# Patient Record
Sex: FEMALE
[De-identification: names, ages, dates, MRNs, and addresses within clinical notes are randomized per-mention and may not be internally consistent; named-entity substitution may affect disease eponyms.]

---

## 2018-08-29 ENCOUNTER — RESULT REVIEW (OUTPATIENT)
Age: 63
End: 2018-08-29

## 2020-03-24 PROBLEM — Z00.00 ENCOUNTER FOR PREVENTIVE HEALTH EXAMINATION: Status: ACTIVE | Noted: 2020-03-24

## 2020-04-03 ENCOUNTER — APPOINTMENT (OUTPATIENT)
Dept: SURGICAL ONCOLOGY | Facility: CLINIC | Age: 65
End: 2020-04-03

## 2021-01-29 ENCOUNTER — APPOINTMENT (OUTPATIENT)
Dept: SURGICAL ONCOLOGY | Facility: CLINIC | Age: 66
End: 2021-01-29
Payer: MEDICARE

## 2021-01-29 VITALS
SYSTOLIC BLOOD PRESSURE: 140 MMHG | DIASTOLIC BLOOD PRESSURE: 68 MMHG | HEIGHT: 63 IN | HEART RATE: 56 BPM | RESPIRATION RATE: 15 BRPM | WEIGHT: 155 LBS | BODY MASS INDEX: 27.46 KG/M2 | OXYGEN SATURATION: 95 %

## 2021-01-29 DIAGNOSIS — Z78.9 OTHER SPECIFIED HEALTH STATUS: ICD-10-CM

## 2021-01-29 DIAGNOSIS — Z86.79 PERSONAL HISTORY OF OTHER DISEASES OF THE CIRCULATORY SYSTEM: ICD-10-CM

## 2021-01-29 DIAGNOSIS — Z80.1 FAMILY HISTORY OF MALIGNANT NEOPLASM OF TRACHEA, BRONCHUS AND LUNG: ICD-10-CM

## 2021-01-29 DIAGNOSIS — Z80.3 FAMILY HISTORY OF MALIGNANT NEOPLASM OF BREAST: ICD-10-CM

## 2021-01-29 DIAGNOSIS — Z82.49 FAMILY HISTORY OF ISCHEMIC HEART DISEASE AND OTHER DISEASES OF THE CIRCULATORY SYSTEM: ICD-10-CM

## 2021-01-29 DIAGNOSIS — N64.4 MASTODYNIA: ICD-10-CM

## 2021-01-29 DIAGNOSIS — Z86.69 PERSONAL HISTORY OF OTHER DISEASES OF THE NERVOUS SYSTEM AND SENSE ORGANS: ICD-10-CM

## 2021-01-29 DIAGNOSIS — I51.9 HEART DISEASE, UNSPECIFIED: ICD-10-CM

## 2021-01-29 PROCEDURE — 99204 OFFICE O/P NEW MOD 45 MIN: CPT

## 2021-01-30 PROBLEM — Z80.1 FAMILY HISTORY OF LUNG CANCER: Status: ACTIVE | Noted: 2021-01-29

## 2021-01-30 PROBLEM — I51.9 HEART PROBLEM: Status: RESOLVED | Noted: 2021-01-29 | Resolved: 2021-01-30

## 2021-01-30 PROBLEM — Z78.9 NON-SMOKER: Status: ACTIVE | Noted: 2021-01-29

## 2021-01-30 PROBLEM — Z86.79 HISTORY OF HYPERTENSION: Status: RESOLVED | Noted: 2021-01-29 | Resolved: 2021-01-30

## 2021-01-30 PROBLEM — Z82.49 FAMILY HISTORY OF HEART FAILURE: Status: ACTIVE | Noted: 2021-01-29

## 2021-01-30 PROBLEM — Z86.69 HISTORY OF MIGRAINE HEADACHES: Status: RESOLVED | Noted: 2021-01-29 | Resolved: 2021-01-30

## 2021-01-30 PROBLEM — Z80.3 FAMILY HISTORY OF MALIGNANT NEOPLASM OF BREAST: Status: ACTIVE | Noted: 2021-01-29

## 2021-01-30 RX ORDER — BISOPROLOL FUMARATE AND HYDROCHLOROTHIAZIDE 10; 6.25 MG/1; MG/1
TABLET, FILM COATED ORAL
Refills: 0 | Status: ACTIVE | COMMUNITY

## 2021-01-30 RX ORDER — ATORVASTATIN CALCIUM 20 MG/1
20 TABLET, FILM COATED ORAL
Refills: 0 | Status: ACTIVE | COMMUNITY

## 2021-01-30 NOTE — PHYSICAL EXAM
[Normal] : supple, no neck mass and thyroid not enlarged [Normal Supraclavicular Lymph Nodes] : normal supraclavicular lymph nodes [Normal Axillary Lymph Nodes] : normal axillary lymph nodes [Normal] : oriented to person, place and time, with appropriate affect [de-identified] : Nodularity right medial breast in are of clinical concern likely fibroglandular tissue - US shows only fibrofatty tissue without any solid or cystic lesions.  No masses/adenopathy bilaterally

## 2021-01-30 NOTE — HISTORY OF PRESENT ILLNESS
[de-identified] : 65 year-old female presents for evaluation of breast pain.  She was referred by Dr. Bimal Adkins.  Her history includes benign bilateral excisional breast biopsies.  Most recent bilateral mammogram and sonogram in September 2019 demonstrated benign findings (BIRADS 2).  She completed a left-sided mammogram and sonogram in March 2020 to evaluate left breast pain, which also demonstrated benign findings (BIRADS 2). \par The pt. is due for hear yearly imaging.\par \par Family Hx: breast cancer involving her sister at age 59\par \par The pt. notes a lump in her right medial breast.  Denies pain or d/c.

## 2021-01-30 NOTE — CONSULT LETTER
[Dear  ___] : Dear  [unfilled], [Courtesy Letter:] : I had the pleasure of seeing your patient, [unfilled], in my office today. [Please see my note below.] : Please see my note below. [Sincerely,] : Sincerely, [FreeTextEntry3] : Dav Guillen MD FACS

## 2021-01-30 NOTE — ASSESSMENT
[FreeTextEntry1] : Area of clinical concern right medial breast likely fibroglanduar tissue\par Reassured pt. index of suspicion for malignancy is low\par Yearly mammogram/breast US ordered\par Will f/u when results available

## 2022-06-21 ENCOUNTER — RESULT REVIEW (OUTPATIENT)
Age: 67
End: 2022-06-21